# Patient Record
Sex: MALE | Race: ASIAN | NOT HISPANIC OR LATINO
[De-identification: names, ages, dates, MRNs, and addresses within clinical notes are randomized per-mention and may not be internally consistent; named-entity substitution may affect disease eponyms.]

---

## 2022-09-27 ENCOUNTER — APPOINTMENT (OUTPATIENT)
Dept: ORTHOPEDIC SURGERY | Facility: CLINIC | Age: 62
End: 2022-09-27

## 2022-09-27 DIAGNOSIS — M51.16 INTERVERTEBRAL DISC DISORDERS WITH RADICULOPATHY, LUMBAR REGION: ICD-10-CM

## 2022-09-27 PROBLEM — Z00.00 ENCOUNTER FOR PREVENTIVE HEALTH EXAMINATION: Status: ACTIVE | Noted: 2022-09-27

## 2022-09-27 PROCEDURE — 99203 OFFICE O/P NEW LOW 30 MIN: CPT

## 2022-09-28 NOTE — HISTORY OF PRESENT ILLNESS
[Lower back] : lower back [Gradual] : gradual [2] : 2 [Dull/Aching] : dull/aching [de-identified] : back and left leg pain;  had MRI ;  disc herniation diagnosed improving with meds and time; focal left digit extensor weakness [] : no [FreeTextEntry5] : pt is 62 years old male who present evaluation of the lumber spine pt states he has been experiencing pain on his lower back for a while

## 2022-09-28 NOTE — IMAGING
[de-identified] : motor 5/5 except left EDC and EHL\par - SLR on the left\par diminished sens to LT left S1\par gait nonantalgic\par lumbar spine nontender , + spasm

## 2022-09-28 NOTE — DATA REVIEWED
[MRI] : MRI [Lumbar Spine] : lumbar spine [FreeTextEntry1] : MRI with well defined pathology that correlates with his exam and symptoms;  should respond to time and PT;  will defer ESIs at this point since pain is better controlled than it was earlier;  f/u in approx 6 weeks

## 2023-05-21 ENCOUNTER — NON-APPOINTMENT (OUTPATIENT)
Age: 63
End: 2023-05-21